# Patient Record
Sex: FEMALE | URBAN - METROPOLITAN AREA
[De-identification: names, ages, dates, MRNs, and addresses within clinical notes are randomized per-mention and may not be internally consistent; named-entity substitution may affect disease eponyms.]

---

## 2024-03-18 ENCOUNTER — APPOINTMENT (RX ONLY)
Dept: URBAN - METROPOLITAN AREA CLINIC 89 | Facility: CLINIC | Age: 24
Setting detail: DERMATOLOGY
End: 2024-03-18

## 2024-03-18 DIAGNOSIS — R21 RASH AND OTHER NONSPECIFIC SKIN ERUPTION: ICD-10-CM | Status: INADEQUATELY CONTROLLED

## 2024-03-18 PROCEDURE — ? PRESCRIPTION

## 2024-03-18 PROCEDURE — 99203 OFFICE O/P NEW LOW 30 MIN: CPT

## 2024-03-18 PROCEDURE — ? COUNSELING

## 2024-03-18 PROCEDURE — ? TREATMENT REGIMEN

## 2024-03-18 RX ORDER — CLOBETASOL PROPIONATE 0.5 MG/G
CREAM TOPICAL BID
Qty: 60 | Refills: 3 | Status: ERX | COMMUNITY
Start: 2024-03-18

## 2024-03-18 RX ADMIN — CLOBETASOL PROPIONATE 1: 0.5 CREAM TOPICAL at 00:00

## 2024-03-18 ASSESSMENT — LOCATION DETAILED DESCRIPTION DERM
LOCATION DETAILED: RIGHT SUPRAPUBIC SKIN
LOCATION DETAILED: LEFT LATERAL ABDOMEN

## 2024-03-18 ASSESSMENT — LOCATION ZONE DERM: LOCATION ZONE: TRUNK

## 2024-03-18 ASSESSMENT — LOCATION SIMPLE DESCRIPTION DERM
LOCATION SIMPLE: GROIN
LOCATION SIMPLE: ABDOMEN

## 2024-03-18 NOTE — PROCEDURE: TREATMENT REGIMEN
Detail Level: Zone
Plan: Pt reported strep throat 4-5 months and flu 2 months ago. Pt reports no FHx of psoriasis. Suspecting psoriatic or viral like symptoms. Will start pt on topical steroid and advise pt to call office if sx worsen in 2 weeks
Initiate Treatment: clobetasol 0.05 % topical cream BID - Apply twice daily to rash up to 2 weeks/month as needed.

## 2024-04-19 ENCOUNTER — APPOINTMENT (RX ONLY)
Dept: URBAN - METROPOLITAN AREA CLINIC 89 | Facility: CLINIC | Age: 24
Setting detail: DERMATOLOGY
End: 2024-04-19

## 2024-04-19 DIAGNOSIS — R21 RASH AND OTHER NONSPECIFIC SKIN ERUPTION: ICD-10-CM | Status: IMPROVED

## 2024-04-19 PROCEDURE — 99212 OFFICE O/P EST SF 10 MIN: CPT

## 2024-04-19 PROCEDURE — ? COUNSELING

## 2024-04-19 PROCEDURE — ? TREATMENT REGIMEN

## 2024-04-19 PROCEDURE — ? PRESCRIPTION

## 2024-04-19 RX ORDER — TACROLIMUS 1 MG/G
APPLY OINTMENT TOPICAL BID
Qty: 60 | Refills: 3 | Status: ERX | COMMUNITY
Start: 2024-04-19

## 2024-04-19 RX ADMIN — TACROLIMUS APPLY: 1 OINTMENT TOPICAL at 00:00

## 2024-04-19 ASSESSMENT — LOCATION SIMPLE DESCRIPTION DERM: LOCATION SIMPLE: ABDOMEN

## 2024-04-19 ASSESSMENT — LOCATION DETAILED DESCRIPTION DERM
LOCATION DETAILED: LEFT LATERAL ABDOMEN
LOCATION DETAILED: RIGHT LATERAL ABDOMEN

## 2024-04-19 ASSESSMENT — LOCATION ZONE DERM: LOCATION ZONE: TRUNK

## 2024-04-19 NOTE — PROCEDURE: TREATMENT REGIMEN
Detail Level: Zone
Initiate Treatment: Tacrolimus bid or OTC moisturizer
Discontinue Regimen: Clobetasol